# Patient Record
Sex: FEMALE | Race: WHITE
[De-identification: names, ages, dates, MRNs, and addresses within clinical notes are randomized per-mention and may not be internally consistent; named-entity substitution may affect disease eponyms.]

---

## 2019-06-12 ENCOUNTER — HOSPITAL ENCOUNTER (OUTPATIENT)
Dept: HOSPITAL 95 - ER | Age: 29
Setting detail: OBSERVATION
Discharge: HOME | End: 2019-06-12
Attending: OBSTETRICS & GYNECOLOGY | Admitting: OBSTETRICS & GYNECOLOGY
Payer: COMMERCIAL

## 2019-06-12 VITALS — BODY MASS INDEX: 28.25 KG/M2 | WEIGHT: 180.01 LBS | HEIGHT: 67 IN

## 2019-06-12 DIAGNOSIS — Z88.5: ICD-10-CM

## 2019-06-12 DIAGNOSIS — Z88.1: ICD-10-CM

## 2019-06-12 DIAGNOSIS — N83.292: Primary | ICD-10-CM

## 2019-06-12 DIAGNOSIS — N80.9: ICD-10-CM

## 2019-06-12 LAB
ALBUMIN SERPL BCP-MCNC: 3.8 G/DL (ref 3.4–5)
ALBUMIN/GLOB SERPL: 1.1 {RATIO} (ref 0.8–1.8)
ALT SERPL W P-5'-P-CCNC: 27 U/L (ref 12–78)
ANION GAP SERPL CALCULATED.4IONS-SCNC: 10 MMOL/L (ref 6–16)
AST SERPL W P-5'-P-CCNC: 30 U/L (ref 12–37)
B-HCG SERPL-ACNC: <1 MIU/ML (ref 0–3)
BASOPHILS # BLD AUTO: 0.02 K/MM3 (ref 0–0.23)
BASOPHILS NFR BLD AUTO: 0 % (ref 0–2)
BILIRUB SERPL-MCNC: 1 MG/DL (ref 0.1–1)
BUN SERPL-MCNC: 10 MG/DL (ref 8–24)
CALCIUM SERPL-MCNC: 9.1 MG/DL (ref 8.5–10.1)
CHLORIDE SERPL-SCNC: 112 MMOL/L (ref 98–108)
CO2 SERPL-SCNC: 20 MMOL/L (ref 21–32)
CREAT SERPL-MCNC: 0.72 MG/DL (ref 0.4–1)
DEPRECATED RDW RBC AUTO: 57.5 FL (ref 35.1–46.3)
EOSINOPHIL # BLD AUTO: 0.16 K/MM3 (ref 0–0.68)
EOSINOPHIL NFR BLD AUTO: 3 % (ref 0–6)
ERYTHROCYTE [DISTWIDTH] IN BLOOD BY AUTOMATED COUNT: 18 % (ref 11.7–14.2)
GLOBULIN SER CALC-MCNC: 3.4 G/DL (ref 2.2–4)
GLUCOSE SERPL-MCNC: 80 MG/DL (ref 70–99)
HCT VFR BLD AUTO: 37.8 % (ref 33–51)
HGB BLD-MCNC: 12.4 G/DL (ref 11.5–16)
IMM GRANULOCYTES # BLD AUTO: 0.01 K/MM3 (ref 0–0.1)
IMM GRANULOCYTES NFR BLD AUTO: 0 % (ref 0–1)
LYMPHOCYTES # BLD AUTO: 2.25 K/MM3 (ref 0.84–5.2)
LYMPHOCYTES NFR BLD AUTO: 35 % (ref 21–46)
MCHC RBC AUTO-ENTMCNC: 32.8 G/DL (ref 31.5–36.5)
MCV RBC AUTO: 87 FL (ref 80–100)
MONOCYTES # BLD AUTO: 0.66 K/MM3 (ref 0.16–1.47)
MONOCYTES NFR BLD AUTO: 10 % (ref 4–13)
NEUTROPHILS # BLD AUTO: 3.28 K/MM3 (ref 1.96–9.15)
NEUTROPHILS NFR BLD AUTO: 51 % (ref 41–73)
NRBC # BLD AUTO: 0 K/MM3 (ref 0–0.02)
NRBC BLD AUTO-RTO: 0 /100 WBC (ref 0–0.2)
PLATELET # BLD AUTO: 234 K/MM3 (ref 150–400)
POTASSIUM SERPL-SCNC: 2.9 MMOL/L (ref 3.5–5.5)
PROT SERPL-MCNC: 7.2 G/DL (ref 6.4–8.2)
SODIUM SERPL-SCNC: 142 MMOL/L (ref 136–145)

## 2019-06-12 PROCEDURE — 0UDB7ZX EXTRACTION OF ENDOMETRIUM, VIA NATURAL OR ARTIFICIAL OPENING, DIAGNOSTIC: ICD-10-PCS | Performed by: OBSTETRICS & GYNECOLOGY

## 2019-06-12 PROCEDURE — 0U914ZZ DRAINAGE OF LEFT OVARY, PERCUTANEOUS ENDOSCOPIC APPROACH: ICD-10-PCS | Performed by: OBSTETRICS & GYNECOLOGY

## 2019-06-12 NOTE — NUR
AMA:
PT CALLED THIS RN TO ROOM TO INQUIRE ABOUT DISCHARGE. EXPLAINED TO PATIENT
ROUTINE POST OP VITALS AND MONITORING. PT STATES THAT SHE NEEDS TO LEAVE FOR
PERSONAL REASONS, THAT HER  IS GOING TO ABANDON HER IF SHE DOES NOT
DICHARGE AND THAT HE IS IN CARE CURRENTLY OF HER 2 MONTH OLD CHILD. MD CALLED
TO UPDATE ON PATIENT STATUS. MD STATES THAT HE WOULD LIKE PT TO STAY AND THAT
HE COULD POSSIBLY COME TO SEE HER AND ASSESS HER BEFORE NOON. PT STATES THAT
THIS TIME FRAME DOES NOT WORK FOR HER, THAT SHE HAS TO LEAVE NOW OR ELSE SHE
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
CURRENTLY. EDUCATED R/T AMA PROCEDURE AND CHARGE NURSE NOTIFIED OF PATIENT
DESIRE TO LEAVE AMA. MD NOTIFIED THAT PT LEAVING AMA. RISKS OF LEAVING
EXPLAINED TO PATIENT AND SHE WAS TOLD BY THIS RN TO FOLLOW UP WITH HER DOCTOR
WHEN SHE ARRIVES HOME. IV DC'D WNL. PT HAS BEEN EATING, VOIDING, AMBULATING
AND HAS HAD MINIMAL BLEEDING. PT PAIN MANAGED WITH IBUPROFEN. LEFT VIA
WHEELCHAIR TO CAR WITH BELONGINGS.

## 2019-12-13 ENCOUNTER — HOSPITAL ENCOUNTER (EMERGENCY)
Dept: HOSPITAL 19 - COL.ER | Age: 29
Discharge: HOME | End: 2019-12-13
Payer: MEDICAID

## 2019-12-13 VITALS — WEIGHT: 180.34 LBS | BODY MASS INDEX: 28.3 KG/M2 | HEIGHT: 67.01 IN

## 2019-12-13 VITALS — SYSTOLIC BLOOD PRESSURE: 111 MMHG | DIASTOLIC BLOOD PRESSURE: 63 MMHG

## 2019-12-13 VITALS — HEART RATE: 79 BPM | TEMPERATURE: 98.2 F
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
## 2019-12-13 DIAGNOSIS — S93.601A: ICD-10-CM
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
## 2019-12-13 DIAGNOSIS — S93.401A: Primary | ICD-10-CM

## 2022-11-04 ENCOUNTER — HOSPITAL ENCOUNTER (EMERGENCY)
Dept: HOSPITAL 19 - COL.ER | Age: 32
Discharge: HOME | End: 2022-11-04
Attending: EMERGENCY MEDICINE
Payer: COMMERCIAL

## 2022-11-04 VITALS — TEMPERATURE: 98.1 F

## 2022-11-04 VITALS — HEIGHT: 65.98 IN | WEIGHT: 250.45 LBS | BODY MASS INDEX: 40.25 KG/M2

## 2022-11-04 VITALS — SYSTOLIC BLOOD PRESSURE: 99 MMHG | DIASTOLIC BLOOD PRESSURE: 68 MMHG | HEART RATE: 58 BPM

## 2022-11-04 DIAGNOSIS — S20.219A: Primary | ICD-10-CM
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
## 2022-11-04 DIAGNOSIS — Z88.5: ICD-10-CM

## 2022-11-04 DIAGNOSIS — S50.11XA: ICD-10-CM
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
## 2022-11-04 DIAGNOSIS — Z28.310: ICD-10-CM

## 2023-03-31 ENCOUNTER — HOSPITAL ENCOUNTER (OUTPATIENT)
Dept: HOSPITAL 19 - WSPT | Age: 33
End: 2023-03-31
Payer: MEDICAID

## 2023-03-31 DIAGNOSIS — M25.511: Primary | ICD-10-CM

## 2023-04-17 ENCOUNTER — HOSPITAL ENCOUNTER (OUTPATIENT)
Dept: HOSPITAL 19 - WSPT | Age: 33
LOS: 13 days | Discharge: HOME | End: 2023-04-30
Payer: MEDICAID

## 2023-04-17 DIAGNOSIS — M25.511: Primary | ICD-10-CM

## 2023-04-24 ENCOUNTER — HOSPITAL ENCOUNTER (EMERGENCY)
Dept: HOSPITAL 19 - COL.ER | Age: 33
LOS: 1 days | Discharge: HOME | End: 2023-04-25
Attending: EMERGENCY MEDICINE
Payer: MEDICAID

## 2023-04-24 VITALS — WEIGHT: 250.45 LBS | HEIGHT: 65.98 IN | BODY MASS INDEX: 40.25 KG/M2

## 2023-04-24 VITALS — TEMPERATURE: 98 F

## 2023-04-24 DIAGNOSIS — R06.02: ICD-10-CM

## 2023-04-24 DIAGNOSIS — R05.9: ICD-10-CM

## 2023-04-24 DIAGNOSIS — Z28.310: ICD-10-CM

## 2023-04-24 DIAGNOSIS — R07.89: Primary | ICD-10-CM

## 2023-04-24 LAB
ALBUMIN SERPL-MCNC: 3.5 GM/DL (ref 3.5–5)
ALP SERPL-CCNC: 84 U/L (ref 40–150)
ALT SERPL-CCNC: 15 U/L (ref 0–55)
ANION GAP SERPL CALC-SCNC: 7 MMOL/L (ref 7–16)
AST SERPL-CCNC: 23 U/L (ref 5–34)
BASOPHILS # BLD: 0 K/MM3 (ref 0–0.2)
BASOPHILS NFR BLD AUTO: 0.7 % (ref 0–2)
BILIRUB SERPL-MCNC: 0.2 MG/DL (ref 0.2–1.2)
BUN SERPL-MCNC: 19 MG/DL (ref 7–19)
CALCIUM SERPL-MCNC: 9 MG/DL (ref 8.4–10.2)
CHLORIDE SERPL-SCNC: 111 MMOL/L (ref 98–107)
CO2 SERPL-SCNC: 24 MMOL/L (ref 22–29)
CREAT SERPL-SCNC: 0.94 MG/DL (ref 0.57–1.11)
EOSINOPHIL # BLD: 0 K/MM3 (ref 0–0.7)
EOSINOPHIL NFR BLD: 0.2 % (ref 0–4)
ERYTHROCYTE [DISTWIDTH] IN BLOOD BY AUTOMATED COUNT: 13.9 % (ref 11.5–14.5)
GLUCOSE SERPL-MCNC: 75 MG/DL (ref 70–99)
GRANULOCYTES # BLD AUTO: 44.8 % (ref 42.2–75.2)
HCT VFR BLD AUTO: 41.5 % (ref 37–47)
HGB BLD-MCNC: 13.6 G/DL (ref 12.5–16)
LYMPHOCYTES # BLD: 1.8 K/MM3 (ref 1.2–3.4)
LYMPHOCYTES NFR BLD: 41.7 % (ref 20–51)
MCH RBC QN AUTO: 31 PG (ref 27–31)
MCHC RBC AUTO-ENTMCNC: 33 G/DL (ref 33–37)
MCV RBC AUTO: 94 FL (ref 80–100)
MONOCYTES # BLD: 0.5 K/MM3 (ref 0.1–0.6)
MONOCYTES NFR BLD AUTO: 12.4 % (ref 1.7–9.3)
NEUTROPHILS # BLD: 1.9 K/MM3 (ref 1.4–6.5)
PLATELET # BLD AUTO: 220 K/MM3 (ref 130–400)
PMV BLD AUTO: 9.1 FL (ref 7.4–10.4)
POTASSIUM SERPL-SCNC: 3.7 MMOL/L (ref 3.5–4.5)
PROT SERPL-MCNC: 6.9 GM/DL (ref 6.2–8.1)
RBC # BLD AUTO: 4.4 M/MM3 (ref 4.1–5.3)
SODIUM SERPL-SCNC: 142 MMOL/L (ref 136–145)
TROPONIN I SERPL-MCNC: < 0.01 NG/ML (ref 0–0.03)

## 2023-04-25 VITALS — SYSTOLIC BLOOD PRESSURE: 105 MMHG | DIASTOLIC BLOOD PRESSURE: 71 MMHG | HEART RATE: 88 BPM
